# Patient Record
Sex: FEMALE | NOT HISPANIC OR LATINO | URBAN - METROPOLITAN AREA
[De-identification: names, ages, dates, MRNs, and addresses within clinical notes are randomized per-mention and may not be internally consistent; named-entity substitution may affect disease eponyms.]

---

## 2023-06-26 PROBLEM — Z00.00 ENCOUNTER FOR PREVENTIVE HEALTH EXAMINATION: Status: ACTIVE | Noted: 2023-06-26

## 2023-07-02 ENCOUNTER — OUTPATIENT (OUTPATIENT)
Dept: OUTPATIENT SERVICES | Facility: HOSPITAL | Age: 33
LOS: 1 days | Discharge: HOME | End: 2023-07-02

## 2023-07-02 DIAGNOSIS — Z15.89 GENETIC SUSCEPTIBILITY TO OTHER DISEASE: ICD-10-CM

## 2023-07-05 ENCOUNTER — APPOINTMENT (OUTPATIENT)
Dept: HEMATOLOGY ONCOLOGY | Facility: CLINIC | Age: 33
End: 2023-07-05

## 2023-07-06 NOTE — DISCUSSION/SUMMARY
[FreeTextEntry1] : REASON FOR CONSULT\par Tessie Dias is a 32-year-old female who self-referred for cancer genetic counseling and risk assessment due to her family history of cancer and her spouse with a reported BRCA1 mutation. Genetic counseling student, Gifty Jacob, also participated in the session.\par \par RELEVANT MEDICAL HISTORY\par Ms. Dias is a healthy individual who has never had cancer. She reports that her spouse is BRCA1 positive, and she also has a family history of cancer, see below.\par \par OTHER MEDICAL AND SURGICAL HISTORY:\par Medical: PCOS, being evaluated for IBS with GI\par Surgical: Denies\par \par PAST OB/GYN HISTORY:\par Obstetrical History: \par Age at Menarche: 10\par Premenopausal \par Age at First Live Birth: 27\par Oral Contraceptive Use: Yes, Progestin only for at most 1year total. \par Hormone Replacement Therapy: No\par \par CANCER SCREENING HISTORY:  \par Breast: \par •	Mammo: None\par •	Breast US: None\par •	Breast MRI: None\par •	Breast Biopsies: None\par GYN:\par •	GYN annual exam: annually prior to most recent pregnancy\par •	Most recent pap smear: 2021; Results: reportedly negative\par •	Ms. Dias reports prior HPV+ pap for 3 years in her mid-20s that “cleared”\par •	Most recent transvaginal ultrasound: 2021; Results: reportedly showing intermittent polycystic ovaries; Frequency: yearly except during pregnancies\par •	CA-125: No\par Colon:\par •	Most recent colonoscopy: reportedly 6 years ago with no findings\par •	Total Polyps: 0 per patient\par Skin:  \par •	Most recent skin exam: ; Results: reportedly assessed a benign appearing arm lesion \par •	Ms. iDas reports previously having a benign mole removed\par Other:\par •	Most recent endoscopy: reportedly  showing acid reflux\par \par SOCIAL HISTORY:\par •	\par •	Tobacco-product use: Never smoker\par •	Environmental exposure: No\par \par FAMILY HISTORY:\par Maternal ancestry was reported as [place] and paternal ancestry was reported as [place], Mediterranean, Balkan, and . A detailed family history of cancer was ascertained. Relevant diagnoses are detailed below and in the scanned pedigree. \par \par Of note, Ms. Dias reports that her spouse had genetic testing, which showed he was BRCA1 positive. His report is unavailable for review at this time.\par 	\par 	RISK ASSESSMENT:\par Ms. Dias’s family history of her paternal grandmother with breast cancer diagnosed at age 49 and her father with prostate cancer is suggestive of an inherited predisposition to breast cancer and related cancers. \par \par It was explained that the most informative strategy is to begin genetic testing with an affected relative, or someone most closely related to one. If a cancer-predisposing mutation is detected, other family members, including the patient, can undergo targeted testing. As such, the patient’s father was identified as a more appropriate candidate for testing. Ms. Dias stated her father is unlikely to pursue genetic testing and would like to pursue testing herself today.\par \par We therefore recommended genetic testing for a Breast Cancer Guidelines-Based Panel and a Prostate Cancer Panel, and Ms. Dias elected to also include a Colorectal Cancer Guidelines-Based Panel in her testing given the additional family history of colon cancer. This test analyzes [28] genes: APC, ROWENA, AXIN2, BARD1, BMPR1A, BRCA1, BRCA2, CDH1, CHEK2, EPCAM, GREM1, HOXB13, MLH1, MSH2, MSH3, MSH6, MUTYH, NBN, NF1, NTHL1, PALB2, PMS2, POLD1, POLE, PTEN, SMAD4, STK11, TP53.\par \par We discussed the risks, benefits and limitations, and implications of genetic testing. We also discussed the psychosocial implications of genetic testing. Possible test results were reviewed with Ms. Dias, along with associated medical management options. The Genetic Information Non-discrimination Act (EPIFANIO) was also reviewed. Ms. Dias stated that she is currently in the process of obtaining life insurance but would like to proceed with genetic testing today and would not like to defer at this time.\par \par Ms. Dias consented to the above-mentioned genetic testing panel. Blood was drawn in our laboratory and sent to [facility] today.\par \par PLAN:\par \par 1.	Blood drawn today will be sent to [facility] for analysis. \par 2.	We will contact Ms. Dias once the results are available and will schedule a follow-up appointment, as needed. Results generally return in 2-3 weeks from the day the sample is received in the lab.\par 3.	Her father’s medical records she provided will be scanned to [facility] secure file cabinet.\par \par \par For any additional questions please call [facility] at [phone]. \par \par \par Diamond Glez MS, [facility] [place] Genetic Counselor, [facility]\par \par \par CC: \par Patient

## 2023-07-15 ENCOUNTER — FORM ENCOUNTER (OUTPATIENT)
Age: 33
End: 2023-07-15

## 2023-07-16 ENCOUNTER — FORM ENCOUNTER (OUTPATIENT)
Age: 33
End: 2023-07-16

## 2023-07-20 ENCOUNTER — NON-APPOINTMENT (OUTPATIENT)
Age: 33
End: 2023-07-20

## 2023-07-20 NOTE — DISCUSSION/SUMMARY
[FreeTextEntry1] : RESULTS TRANSMISSION\par Tessie Dias is a 32-year-old female who was called 7/20/2023 for a discussion regarding their genetic testing results related to hereditary cancer predisposition. \par \par Ms. Dias was originally seen at [facility] on 6/5/2023 for hereditary cancer predisposition risk assessment. Ms. Dias is a healthy individual who has never had cancer, however she has a family history of breast, colon, and pancreatic cancers. Ms. Dias decided to pursue genetic testing using a Breast Cancer Guidelines-Based Panel, Prostate Cancer Panel, and Colorectal Cancer Guidelines-Based Panel offered by [facility]. Upon disclosure of these negative panel results on 7/17/2023, Ms. Dias elected to expand testing to include a Breast and GYN Cancers Guidelines-Based Panel.\par \par TEST RESULTS: NEGATIVE\par \par No pathogenic (disease-causing) variants or VUSs were detected in the following genes:  APC, ROWENA, AXIN2, BARD1, BMPR1A, BRCA1, BRCA2, BRIP1, CDH1, CHEK2, EPCAM, GREM1, HOXB13, MLH1, MSH2, MSH3, MSH6, MUTYH, NBN, NF1, NTHL1, PALB2, PMS2, POLD1, POLE, PTEN, RAD51C, RAD51D, SMAD4, STK11, TP53 \par RESULTS INTERPRETATION AND ASSESSMENT:\par Given Ms. Dias’s current reported family history of cancer, and her negative genetic test results, we discussed that, in the absence of other indications, Ms. Dias should practice age-appropriate cancer screening of other organ systems as recommended for the general population. \par \par We also discussed that, while no cause of the patient’s family history of cancer was identified, this result, while reassuring, does entirely not rule out a hereditary cancer risk in the patient. It is possible, although unlikely, the patient has a mutation in one of the genes tested that is not detectable by this analysis, or has a mutation in a different gene, either known or unknown. It is also possible there is a hereditary cancer predisposition in the family, but the patient did not inherit it.\par \par We informed Ms. Dias that our knowledge of genetics and inherited cancer conditions is changing rapidly. Therefore, we recommended that Ms. Dias contact our office, every 2 to 3 years, to discuss relevant advances in cancer genetics.  We emphasized the importance of re-contacting us with updates regarding her personal and family history of cancer as well as any updates regarding additional cancer genetic test results performed for the patient and/or family members.  Such updates could possibly change our risk assessment and recommendations. \par \par In addition, we discussed Ms. Dias’s father and paternal uncles and aunt could consider pursuing cancer risk assessment genetic counseling with the option of genetic testing given their mother’s history of early-onset breast cancer. \par \par PLAN:\par 1.	See above for recommended screening and risk-reduction strategies.\par 2.	Patient informed consult note(s) will be available through their [facility] patient portal and genetic test results will be released via [facility]’s laboratory portal. \par 3.	Ms. Dias was encouraged to contact us every 2-3 years to discuss relevant advances in cancer genetics, or sooner if there are any changes in her personal or family history of cancer.\par \par \par For any additional questions please call [facility] at [phone]. \par \par \par Diamond Glez MS, [facility] [place] [facility]\par Genetic Counselor, [facility]\par \par \par CC: \par Patient

## 2024-02-21 ENCOUNTER — NON-APPOINTMENT (OUTPATIENT)
Age: 34
End: 2024-02-21

## 2024-03-12 ENCOUNTER — NON-APPOINTMENT (OUTPATIENT)
Age: 34
End: 2024-03-12

## 2024-03-12 ENCOUNTER — APPOINTMENT (OUTPATIENT)
Dept: CARDIOLOGY | Facility: CLINIC | Age: 34
End: 2024-03-12
Payer: COMMERCIAL

## 2024-03-12 VITALS
WEIGHT: 167 LBS | BODY MASS INDEX: 27.82 KG/M2 | OXYGEN SATURATION: 98 % | DIASTOLIC BLOOD PRESSURE: 68 MMHG | HEART RATE: 93 BPM | SYSTOLIC BLOOD PRESSURE: 102 MMHG | HEIGHT: 65 IN

## 2024-03-12 DIAGNOSIS — E28.2 POLYCYSTIC OVARIAN SYNDROME: ICD-10-CM

## 2024-03-12 DIAGNOSIS — R07.89 OTHER CHEST PAIN: ICD-10-CM

## 2024-03-12 DIAGNOSIS — K58.9 IRRITABLE BOWEL SYNDROME W/OUT DIARRHEA: ICD-10-CM

## 2024-03-12 DIAGNOSIS — Z83.438 FAMILY HISTORY OF OTHER DISORDER OF LIPOPROTEIN METABOLISM AND OTHER LIPIDEMIA: ICD-10-CM

## 2024-03-12 DIAGNOSIS — R55 SYNCOPE AND COLLAPSE: ICD-10-CM

## 2024-03-12 DIAGNOSIS — Z82.49 FAMILY HISTORY OF ISCHEMIC HEART DISEASE AND OTHER DISEASES OF THE CIRCULATORY SYSTEM: ICD-10-CM

## 2024-03-12 PROCEDURE — 93242 EXT ECG>48HR<7D RECORDING: CPT

## 2024-03-12 PROCEDURE — 93000 ELECTROCARDIOGRAM COMPLETE: CPT | Mod: 59

## 2024-03-12 PROCEDURE — 99204 OFFICE O/P NEW MOD 45 MIN: CPT | Mod: 25

## 2024-03-12 NOTE — REVIEW OF SYSTEMS
[Chest Discomfort] : chest discomfort [Negative] : Heme/Lymph [SOB] : no shortness of breath [Dyspnea on exertion] : not dyspnea during exertion [Leg Claudication] : no intermittent leg claudication [Palpitations] : no palpitations [Orthopnea] : no orthopnea [Syncope] : no syncope

## 2024-03-12 NOTE — HISTORY OF PRESENT ILLNESS
[FreeTextEntry1] : Pt is a 32 y/o F who is referred here today by their PCP for evaluation.  She has PMH IBS and when she gets stomach discomfort she gets flushed, feels faint, sweaty, dizzy and as if she will pass out.  This has been occurring for year and is usually about 1-2x/year. She got COVID a few months ago and fells like her symptoms worsened.  Now she also has CP, not associated with exertion.     PCP Dr LARA PMH: BMI 27, PCOS Previous hospitalizations: none Previous surgeries: none Family hx: mother HLD Smoking status: never no ETOH no drug use Current exercise: none Daily water intake: not sure Daily caffeine intake: 1 cup green tea OTC medications: immune vit Previous cardiac testing: stress test and echo as a teenager "normal" 2 children - GDM

## 2024-03-12 NOTE — DISCUSSION/SUMMARY
[EKG obtained to assist in diagnosis and management of assessed problem(s)] : EKG obtained to assist in diagnosis and management of assessed problem(s) [FreeTextEntry1] : Pt is a 34 y/o F PMH IBS and when she gets stomach discomfort she gets flushed, feels faint, sweaty, dizzy and as if she will pass out.  This has been occurring for year and is usually about 1-2x/year. She got COVID a few months ago and fells like her symptoms worsened.  Now she also has CP, not associated with exertion.    Given pt's symptoms will check moraes monitor to assess for ectopy.  Advised adequate hydration.  Drug use, OTC medication use, caffeine consumption reviewed  Moraes placed same day as office visit.  Will check transthoracic echocardiogram to evaluate left ventricular function and assess for any structural abnormalities  will perform ETT to assess patient's current cardiac reserve to incremental activity and check for provocable ECG changes.  check labs Advised pt to go to the nearest ED if symptoms persist or worsen   Most recent available lab results were reviewed with pt. The described plan was discussed with the pt.  All questions and concerns were addressed to the best of my knowledge.

## 2024-03-12 NOTE — PHYSICAL EXAM
[Well Developed] : well developed [Well Nourished] : well nourished [Normal Conjunctiva] : normal conjunctiva [No Acute Distress] : no acute distress [Normal Venous Pressure] : normal venous pressure [No Carotid Bruit] : no carotid bruit [Normal S1, S2] : normal S1, S2 [No Murmur] : no murmur [No Rub] : no rub [No Gallop] : no gallop [Good Air Entry] : good air entry [Clear Lung Fields] : clear lung fields [No Respiratory Distress] : no respiratory distress  [Soft] : abdomen soft [Non Tender] : non-tender [No Masses/organomegaly] : no masses/organomegaly [Normal Bowel Sounds] : normal bowel sounds [Normal Gait] : normal gait [No Edema] : no edema [No Cyanosis] : no cyanosis [No Clubbing] : no clubbing [No Varicosities] : no varicosities [No Skin Lesions] : no skin lesions [No Rash] : no rash [Moves all extremities] : moves all extremities [Normal Speech] : normal speech [No Focal Deficits] : no focal deficits [Alert and Oriented] : alert and oriented [Normal memory] : normal memory

## 2024-03-26 ENCOUNTER — APPOINTMENT (OUTPATIENT)
Dept: CARDIOLOGY | Facility: CLINIC | Age: 34
End: 2024-03-26
Payer: COMMERCIAL

## 2024-03-26 PROCEDURE — 93015 CV STRESS TEST SUPVJ I&R: CPT

## 2024-07-10 ENCOUNTER — APPOINTMENT (OUTPATIENT)
Dept: CARDIOLOGY | Facility: CLINIC | Age: 34
End: 2024-07-10

## 2024-07-15 ENCOUNTER — APPOINTMENT (OUTPATIENT)
Dept: CARDIOLOGY | Facility: CLINIC | Age: 34
End: 2024-07-15

## 2024-09-27 ENCOUNTER — APPOINTMENT (OUTPATIENT)
Dept: CARDIOLOGY | Facility: CLINIC | Age: 34
End: 2024-09-27
Payer: COMMERCIAL

## 2024-09-27 PROCEDURE — 93306 TTE W/DOPPLER COMPLETE: CPT

## 2024-09-27 PROCEDURE — 93880 EXTRACRANIAL BILAT STUDY: CPT

## 2024-10-04 ENCOUNTER — TRANSCRIPTION ENCOUNTER (OUTPATIENT)
Age: 34
End: 2024-10-04

## 2024-10-14 ENCOUNTER — TRANSCRIPTION ENCOUNTER (OUTPATIENT)
Age: 34
End: 2024-10-14